# Patient Record
Sex: MALE | Race: WHITE | NOT HISPANIC OR LATINO | Employment: FULL TIME | URBAN - METROPOLITAN AREA
[De-identification: names, ages, dates, MRNs, and addresses within clinical notes are randomized per-mention and may not be internally consistent; named-entity substitution may affect disease eponyms.]

---

## 2018-11-01 ENCOUNTER — IMMUNIZATION (OUTPATIENT)
Dept: SOCIAL WORK | Facility: CLINIC | Age: 28
End: 2018-11-01
Payer: COMMERCIAL

## 2018-11-01 DIAGNOSIS — Z23 NEED FOR VACCINATION: ICD-10-CM

## 2018-11-01 PROCEDURE — 90686 IIV4 VACC NO PRSV 0.5 ML IM: CPT | Performed by: REGISTERED NURSE

## 2018-11-01 PROCEDURE — 90471 IMMUNIZATION ADMIN: CPT | Performed by: REGISTERED NURSE

## 2019-10-30 ENCOUNTER — IMMUNIZATION (OUTPATIENT)
Dept: SOCIAL WORK | Facility: CLINIC | Age: 29
End: 2019-10-30
Payer: COMMERCIAL

## 2019-10-30 DIAGNOSIS — Z23 NEED FOR VACCINATION: ICD-10-CM

## 2019-10-30 PROCEDURE — 90471 IMMUNIZATION ADMIN: CPT | Performed by: REGISTERED NURSE

## 2019-10-30 PROCEDURE — 90686 IIV4 VACC NO PRSV 0.5 ML IM: CPT | Performed by: REGISTERED NURSE

## 2019-12-23 ENCOUNTER — OFFICE VISIT (OUTPATIENT)
Dept: MEDICAL GROUP | Facility: PHYSICIAN GROUP | Age: 29
End: 2019-12-23
Payer: COMMERCIAL

## 2019-12-23 ENCOUNTER — HOSPITAL ENCOUNTER (OUTPATIENT)
Dept: LAB | Facility: MEDICAL CENTER | Age: 29
End: 2019-12-23
Attending: FAMILY MEDICINE
Payer: COMMERCIAL

## 2019-12-23 VITALS
BODY MASS INDEX: 21.11 KG/M2 | HEIGHT: 70 IN | HEART RATE: 58 BPM | WEIGHT: 147.5 LBS | DIASTOLIC BLOOD PRESSURE: 64 MMHG | RESPIRATION RATE: 18 BRPM | OXYGEN SATURATION: 98 % | SYSTOLIC BLOOD PRESSURE: 108 MMHG | TEMPERATURE: 98 F

## 2019-12-23 DIAGNOSIS — R36.1 BLOODY EJACULATION: ICD-10-CM

## 2019-12-23 DIAGNOSIS — Z23 NEED FOR VACCINATION: ICD-10-CM

## 2019-12-23 DIAGNOSIS — Z13.6 SCREENING FOR CARDIOVASCULAR CONDITION: ICD-10-CM

## 2019-12-23 DIAGNOSIS — N45.1 EPIDIDYMITIS, LEFT: ICD-10-CM

## 2019-12-23 LAB
APPEARANCE UR: CLEAR
BACTERIA #/AREA URNS HPF: NEGATIVE /HPF
BILIRUB UR QL STRIP.AUTO: NEGATIVE
CHOLEST SERPL-MCNC: 173 MG/DL (ref 100–199)
COLOR UR: YELLOW
EPI CELLS #/AREA URNS HPF: NEGATIVE /HPF
GLUCOSE UR STRIP.AUTO-MCNC: NEGATIVE MG/DL
HDLC SERPL-MCNC: 55 MG/DL
HYALINE CASTS #/AREA URNS LPF: ABNORMAL /LPF
KETONES UR STRIP.AUTO-MCNC: NEGATIVE MG/DL
LDLC SERPL CALC-MCNC: 96 MG/DL
LEUKOCYTE ESTERASE UR QL STRIP.AUTO: NEGATIVE
MICRO URNS: ABNORMAL
NITRITE UR QL STRIP.AUTO: NEGATIVE
PH UR STRIP.AUTO: 7.5 [PH] (ref 5–8)
PROT UR QL STRIP: NEGATIVE MG/DL
PSA SERPL-MCNC: 0.72 NG/ML (ref 0–4)
RBC # URNS HPF: ABNORMAL /HPF
RBC UR QL AUTO: ABNORMAL
SP GR UR STRIP.AUTO: 1.01
SPERM #/AREA URNS HPF: ABNORMAL /HPF
TRIGL SERPL-MCNC: 108 MG/DL (ref 0–149)
UROBILINOGEN UR STRIP.AUTO-MCNC: 0.2 MG/DL
WBC #/AREA URNS HPF: ABNORMAL /HPF

## 2019-12-23 PROCEDURE — 84153 ASSAY OF PSA TOTAL: CPT

## 2019-12-23 PROCEDURE — 90471 IMMUNIZATION ADMIN: CPT | Performed by: FAMILY MEDICINE

## 2019-12-23 PROCEDURE — 87491 CHLMYD TRACH DNA AMP PROBE: CPT

## 2019-12-23 PROCEDURE — 36415 COLL VENOUS BLD VENIPUNCTURE: CPT

## 2019-12-23 PROCEDURE — 81001 URINALYSIS AUTO W/SCOPE: CPT

## 2019-12-23 PROCEDURE — 90715 TDAP VACCINE 7 YRS/> IM: CPT | Performed by: FAMILY MEDICINE

## 2019-12-23 PROCEDURE — 99203 OFFICE O/P NEW LOW 30 MIN: CPT | Mod: 25 | Performed by: FAMILY MEDICINE

## 2019-12-23 PROCEDURE — 87591 N.GONORRHOEAE DNA AMP PROB: CPT

## 2019-12-23 PROCEDURE — 80061 LIPID PANEL: CPT

## 2019-12-23 RX ORDER — LEVOFLOXACIN 500 MG/1
500 TABLET, FILM COATED ORAL DAILY
Qty: 10 TAB | Refills: 0 | Status: SHIPPED | OUTPATIENT
Start: 2019-12-23 | End: 2020-01-02

## 2019-12-23 SDOH — HEALTH STABILITY: MENTAL HEALTH: HOW OFTEN DO YOU HAVE A DRINK CONTAINING ALCOHOL?: 4 OR MORE TIMES A WEEK

## 2019-12-23 ASSESSMENT — PATIENT HEALTH QUESTIONNAIRE - PHQ9: CLINICAL INTERPRETATION OF PHQ2 SCORE: 0

## 2019-12-23 NOTE — PROGRESS NOTES
"cc: bloody ejaculate       Subjective:     Mario Meade is a 29 y.o. male presenting for the following:     About 5 weeks ago patient noticed a small amount of blood in his ejaculate.  At first it was a light pink color but it has worsened since its onset.  The color is now closer to a dark red.  He does not have any new sexual partners, monogamous with same partner for more than 4 years.  He does not have any burning with urination or urinary frequency..  He does have a mild left testicular pain.  This is not severe.  No trauma to the area.      Review of systems:  All others reviewed and are negative.       Current Outpatient Medications:   •  levoFLOXacin (LEVAQUIN) 500 MG tablet, Take 1 Tab by mouth every day for 10 days., Disp: 10 Tab, Rfl: 0    Allergies, past medical history, past surgical history, family history, social history reviewed and updated    Objective:     Vitals: /64 (BP Location: Left arm, Patient Position: Sitting, BP Cuff Size: Adult)   Pulse (!) 58   Temp 36.7 °C (98 °F) (Temporal)   Resp 18   Ht 1.778 m (5' 10\")   Wt 66.9 kg (147 lb 8 oz)   SpO2 98%   BMI 21.16 kg/m²   General: Alert, pleasant, NAD  HEENT: Normocephalic.   EOMI, no icterus or pallor.  Conjunctivae and lids normal. External ears and nose normal. Oropharynx non-erythematous, mucous membranes moist.    Neck supple.  No thyromegaly or masses palpated. No cervical or supraclavicular lymphadenopathy.  Heart: Regular rate and rhythm.  S1 and S2 normal.  No murmurs appreciated.  Respiratory: Normal respiratory effort.  Clear to auscultation bilaterally.  Abdomen: Non-distended, soft  Skin: Warm, dry, no rashes in exposed areas.  Musculoskeletal: Gait is normal.  Moves all extremities well.  Extremities: No leg edema.    Genital: Normal circumcised penis.  Left testicle slightly tender to palpation.  No masses in either testicle.  No rashes or skin changes.  MIRANDA: Normal tone.  Prostate smooth without masses.  Not " boggy.  Neurological: sensation grossly intact,  tone/strength normal, gait is normal, CN2-12 grossly intact  Psych:  Affect is normal, judgement is good, grooming is appropriate.    Assessment/Plan:     Mario was seen today for [facility] care and other.    Diagnoses and all orders for this visit:    Bloody ejaculate possibly due to a left epididymitis.  Unlikely prostatitis but will check PSA.  Also, patient is low risk for STD but will check for chlamydia gonorrhea.  Unlikely UTI but will ensure no infection with urinalysis.  No masses felt will obtain ultrasound of testicles prior to referral to urology.  Will treat empirically with levofloxacin for 10 days.  Patient warned of common side effects as well as rare risk of aortic aneurysm or tendinitis.  Bloody ejaculation  -     Chlamydia/GC PCR Urine Or Swab; Future  -     URINALYSIS,CULTURE IF INDICATED; Future  -     [id number]; Future  -     PROSTATE SPECIFIC AG SCREENING; Future  -     REFERRAL TO UROLOGY  Epididymitis, left  -     REFERRAL TO UROLOGY  -     levoFLOXacin (LEVAQUIN) 500 MG tablet; Take 1 Tab by mouth every day for 10 days.      Need for vaccination Patient due for vaccination.  Patient warned of possible side effect including pain, reaction at injection site, fatigue, low-grade fever.  Also, patient warned of uncommon severe reactions including allergic reaction/anaphylaxis.   -     Tdap Vaccine =>6YO IM    Screening for cardiovascular condition  -     Lipid Profile; Future    Return if symptoms worsen or fail to improve.

## 2019-12-24 LAB
C TRACH DNA SPEC QL NAA+PROBE: NEGATIVE
N GONORRHOEA DNA SPEC QL NAA+PROBE: NEGATIVE
SPECIMEN SOURCE: NORMAL

## 2020-01-07 ENCOUNTER — TELEPHONE (OUTPATIENT)
Dept: MEDICAL GROUP | Facility: PHYSICIAN GROUP | Age: 30
End: 2020-01-07

## 2020-01-10 ENCOUNTER — HOSPITAL ENCOUNTER (OUTPATIENT)
Dept: RADIOLOGY | Facility: MEDICAL CENTER | Age: 30
End: 2020-01-10
Attending: FAMILY MEDICINE
Payer: COMMERCIAL

## 2020-01-10 DIAGNOSIS — R36.1 BLOODY EJACULATION: ICD-10-CM

## 2020-01-10 PROCEDURE — 76870 US EXAM SCROTUM: CPT

## 2020-10-14 ENCOUNTER — IMMUNIZATION (OUTPATIENT)
Dept: SOCIAL WORK | Facility: CLINIC | Age: 30
End: 2020-10-14
Payer: COMMERCIAL

## 2020-10-14 DIAGNOSIS — Z23 NEED FOR VACCINATION: ICD-10-CM

## 2020-10-14 PROCEDURE — 90471 IMMUNIZATION ADMIN: CPT | Performed by: REGISTERED NURSE

## 2020-10-14 PROCEDURE — 90686 IIV4 VACC NO PRSV 0.5 ML IM: CPT | Performed by: REGISTERED NURSE

## 2021-05-28 ENCOUNTER — OFFICE VISIT (OUTPATIENT)
Dept: URGENT CARE | Facility: PHYSICIAN GROUP | Age: 31
End: 2021-05-28
Payer: COMMERCIAL

## 2021-05-28 ENCOUNTER — APPOINTMENT (OUTPATIENT)
Dept: RADIOLOGY | Facility: IMAGING CENTER | Age: 31
End: 2021-05-28
Attending: PHYSICIAN ASSISTANT
Payer: COMMERCIAL

## 2021-05-28 VITALS
OXYGEN SATURATION: 95 % | WEIGHT: 155 LBS | TEMPERATURE: 98.1 F | RESPIRATION RATE: 12 BRPM | BODY MASS INDEX: 22.19 KG/M2 | SYSTOLIC BLOOD PRESSURE: 118 MMHG | DIASTOLIC BLOOD PRESSURE: 62 MMHG | HEART RATE: 71 BPM | HEIGHT: 70 IN

## 2021-05-28 DIAGNOSIS — S99.922A INJURY OF LEFT FOOT, INITIAL ENCOUNTER: ICD-10-CM

## 2021-05-28 PROBLEM — N43.40 SPERMATOCELE: Status: ACTIVE | Noted: 2020-01-13

## 2021-05-28 PROBLEM — R31.29 MICROSCOPIC HEMATURIA: Status: ACTIVE | Noted: 2020-01-13

## 2021-05-28 PROBLEM — R36.1 HEMOSPERMIA: Status: ACTIVE | Noted: 2020-01-13

## 2021-05-28 PROCEDURE — 73630 X-RAY EXAM OF FOOT: CPT | Mod: TC,LT | Performed by: PHYSICIAN ASSISTANT

## 2021-05-28 PROCEDURE — 99213 OFFICE O/P EST LOW 20 MIN: CPT | Performed by: PHYSICIAN ASSISTANT

## 2021-05-28 ASSESSMENT — ENCOUNTER SYMPTOMS
TINGLING: 0
INABILITY TO BEAR WEIGHT: 1
FALLS: 0
NUMBNESS: 0
SENSORY CHANGE: 0
LOSS OF SENSATION: 0
LOSS OF MOTION: 0

## 2021-05-28 NOTE — PROGRESS NOTES
"Subjective:      Mario Meade is a 31 y.o. male who presents with Leg Injury (happened last night , playing softball and injured left leg, pt wants to make sure no broken bones, having a hard time walking on it )            Patient is a 31-year-old male who presents to urgent care with worsening pain to the inside portion of his left foot after getting hats with a softball.  Patient reports at that time it was very sore however he even played afterwards.  He reports immediate and worsening pain once he took off his cleats which then worsened upon wakening this morning.  Patient admits that his having difficulty applying any weight on the region.  He denies any noted bruising or swelling. He has not taken anything for his symptoms.     Leg Injury   The incident occurred 12 to 24 hours ago. [place]. The injury mechanism was a direct blow. Pain location: Left foot- hit with softball.  The quality of the pain is described as aching. The pain is mild. Associated symptoms include an inability to bear weight. Pertinent negatives include no loss of motion, loss of sensation, numbness or tingling. The symptoms are aggravated by weight bearing and movement. The treatment provided no relief.       Review of Systems   Musculoskeletal: Positive for joint pain. Negative for falls.   Neurological: Negative for tingling, sensory change and numbness.   All other systems reviewed and are negative.         Objective:     /62 (BP Location: Right arm, Patient Position: Sitting, BP Cuff Size: Adult)   Pulse 71   Temp 36.7 °C (98.1 °F) (Temporal)   Resp 12   Ht 1.778 m (5' 10\")   Wt 70.3 kg (155 lb)   SpO2 95%   BMI 22.24 kg/m²    PMH:  has no past medical history on file.  MEDS: Reviewed .   ALLERGIES: No Known Allergies  SURGHX: No past surgical history on file.  SOCHX:  reports that he has never smoked. He has never used smokeless tobacco. He reports current alcohol use. He reports previous " drug use.  FH: Family history was reviewed, no pertinent findings to report    Physical Exam  Vitals reviewed.   Constitutional:       General: He is not in acute distress.     Appearance: He is well-developed.   HENT:      Head: Normocephalic and atraumatic.   Eyes:      Conjunctiva/sclera: Conjunctivae normal.      Pupils: Pupils are equal, round, and reactive to light.   Neck:      Trachea: No tracheal deviation.   Cardiovascular:      Rate and Rhythm: Normal rate.   Pulmonary:      Effort: Pulmonary effort is normal. No respiratory distress.   Musculoskeletal:         General: Tenderness present.      Cervical back: Normal range of motion and neck supple.        Legs:       Comments: Left foot: Medial foot tenderness with deep palpation- without noted erythema or bruising.   Digits NT. Without noted edema.   Ankle NT.   DPF intact- painful dorsiflexion.   N/V intact distally.    Skin:     General: Skin is warm.   Neurological:      Mental Status: He is alert and oriented to person, place, and time.   Psychiatric:         Behavior: Behavior normal.         Thought Content: Thought content normal.         Judgment: Judgment normal.                        Assessment/Plan:        1. Injury of left foot, initial encounter  - DX-FOOT-COMPLETE 3+ LEFT; Future        RADIOLOGY RESULTS   DX-FOOT-COMPLETE 3+ LEFT    Result Date: 5/28/2021 5/28/2021 3:14 PM HISTORY/REASON FOR EXAM:  Pain/Deformity Following Trauma; medial foot pain TECHNIQUE/EXAM DESCRIPTION AND NUMBER OF VIEWS: 3 views of the LEFT foot. COMPARISON: FINDINGS: The alignment is grossly normal. There is no evidence of displaced fracture or dislocation. There is no focal soft tissue swelling.     Normal foot series.       Discussed x-ray findings with the patient today.  Patient is about to go camping-crutches were given to assist with weightbearing during patient's trip.  Compression, ice, NSAIDs.  If minimal improvement after 7 to 10 days of conservative  therapies and activity as tolerated-patient is to return to clinic for recheck.  Appropriate PPE worn at all times by provider.   Pt. Had face mask on throughout entirety of the visit other than oropharyngeal examination today.     Side effects of OTC or prescribed medications discussed.     DDX, Supportive care, and indications for immediate follow-up discussed with patient.    Instructed to return to clinic or nearest emergency department if we are not available for any change in condition, further concerns, or worsening of symptoms.    The patient and/or guardian demonstrated a good understanding and agreed with the treatment plan.    Please note that this dictation was created using voice recognition software. I have made every reasonable attempt to correct obvious errors, but I expect that there are errors of grammar and possibly content that I did not discover before finalizing the note.

## 2022-10-11 ENCOUNTER — OFFICE VISIT (OUTPATIENT)
Dept: URGENT CARE | Facility: CLINIC | Age: 32
End: 2022-10-11
Payer: COMMERCIAL

## 2022-10-11 VITALS
HEIGHT: 70 IN | OXYGEN SATURATION: 98 % | DIASTOLIC BLOOD PRESSURE: 72 MMHG | BODY MASS INDEX: 22.19 KG/M2 | HEART RATE: 66 BPM | SYSTOLIC BLOOD PRESSURE: 110 MMHG | TEMPERATURE: 98.2 F | RESPIRATION RATE: 18 BRPM | WEIGHT: 155 LBS

## 2022-10-11 DIAGNOSIS — S61.432A PUNCTURE WOUND OF LEFT HAND WITHOUT FOREIGN BODY, INITIAL ENCOUNTER: ICD-10-CM

## 2022-10-11 PROCEDURE — 99213 OFFICE O/P EST LOW 20 MIN: CPT | Performed by: PHYSICIAN ASSISTANT

## 2022-10-11 RX ORDER — SULFAMETHOXAZOLE AND TRIMETHOPRIM 800; 160 MG/1; MG/1
1 TABLET ORAL 2 TIMES DAILY
Qty: 14 TABLET | Refills: 0 | Status: SHIPPED | OUTPATIENT
Start: 2022-10-11 | End: 2022-10-18

## 2022-10-12 ASSESSMENT — ENCOUNTER SYMPTOMS
MYALGIAS: 0
TINGLING: 0
BRUISES/BLEEDS EASILY: 0
WEAKNESS: 0

## 2022-10-12 NOTE — PROGRESS NOTES
Subjective:     CHIEF COMPLAINT  Chief Complaint   Patient presents with    Hand Injury     Poked with a Gretchen nail today       HPI  Mario Meade is a very pleasant 32 y.o. male who presents to the clinic after sustaining a puncture wound to his left hand this afternoon.  Patient states a gretchen nail was sticking out of a pallet and he punctured the dorsal aspect of his left hand.  No remaining foreign bodies present.  Able to get bleeding controlled with compression.  He thoroughly cleansed the area with soap and water as well as antiseptic solution.  Currently has antibiotic ointment and a Band-Aid in place.  Denies any pain at this time.  No numbness or tingling distally.  No limitations in range of motion of the hand or digits.  Tetanus was last updated in 2019.    REVIEW OF SYSTEMS  Review of Systems   Musculoskeletal:  Negative for joint pain and myalgias.   Skin:         Puncture wound to left hand   Neurological:  Negative for tingling and weakness.   Endo/Heme/Allergies:  Does not bruise/bleed easily.     PAST MEDICAL HISTORY  Patient Active Problem List    Diagnosis Date Noted    Spermatocele 01/13/2020    Microscopic hematuria 01/13/2020    Hemospermia 01/13/2020       SURGICAL HISTORY  patient denies any surgical history    ALLERGIES  No Known Allergies    CURRENT MEDICATIONS  Home Medications       Reviewed by Joaquin Lam P.A.-C. (Physician Assistant) on 10/11/22 at 2033  Med List Status: <None>     Medication Last Dose Status        Patient Henok Taking any Medications                           SOCIAL HISTORY  Social History     Tobacco Use    Smoking status: Never    Smokeless tobacco: Never   Vaping Use    Vaping Use: Never used   Substance and Sexual Activity    Alcohol use: Yes     Comment: occasionally    Drug use: Not Currently    Sexual activity: Not on file       FAMILY HISTORY  History reviewed. No pertinent family history.       Objective:     VITAL SIGNS: /72   Pulse 66    "Temp 36.8 °C (98.2 °F) (Temporal)   Resp 18   Ht 1.778 m (5' 10\")   Wt 70.3 kg (155 lb)   SpO2 98%   BMI 22.24 kg/m²     PHYSICAL EXAM  Physical Exam  Constitutional:       Appearance: Normal appearance.   HENT:      Head: Normocephalic and atraumatic.   Eyes:      Conjunctiva/sclera: Conjunctivae normal.   Musculoskeletal:         General: No swelling or tenderness. Normal range of motion.        Hands:       Cervical back: Normal range of motion.      Comments: Left hand: Small puncture wound to the dorsal aspect of the left hand.  No active bleeding.  No underlying foreign bodies visualized.  Patient maintains full range of motion of the wrist and all digits.  No purulent discharge or drainage.   Skin:     Capillary Refill: Capillary refill takes less than 2 seconds.   Neurological:      General: No focal deficit present.      Mental Status: He is alert and oriented to person, place, and time. Mental status is at baseline.       Assessment/Plan:     1. Puncture wound of left hand without foreign body, initial encounter  - sulfamethoxazole-trimethoprim (BACTRIM DS) 800-160 MG tablet; Take 1 Tablet by mouth 2 times a day for 7 days.  Dispense: 14 Tablet; Refill: 0      MDM/Comments:    Patient sustained a small puncture wound to the dorsal aspect of the left hand this afternoon.  Thoroughly irrigated and cleansed by the patient at his house.  No underlying foreign bodies present.  Patient's tetanus is up-to-date.  Full and pain-free range of motion of the hand.  We will cover for potential infection with a course of Bactrim.  Wound care instructions were discussed.  Return precautions for any signs of infection.    Differential diagnosis, natural history, supportive care, and indications for immediate follow-up discussed. All questions answered. Patient agrees with the plan of care.    Follow-up as needed if symptoms worsen or fail to improve to PCP, Urgent care or Emergency Room.    I have personally reviewed " prior external notes and test results pertinent to today's visit.  I have independently reviewed and interpreted all diagnostics ordered during this urgent care acute visit.   Discussed management options (risks,benefits, and alternatives to treatment). Pt expresses understanding and the treatment plan was agreed upon. Questions were encouraged and answered to pt's satisfaction.    Please note that this dictation was created using voice recognition software. I have made a reasonable attempt to correct obvious errors, but I expect that there are errors of grammar and possibly content that I did not discover before finalizing the note.

## 2025-02-05 ENCOUNTER — OFFICE VISIT (OUTPATIENT)
Dept: MEDICAL GROUP | Facility: CLINIC | Age: 35
End: 2025-02-05
Payer: COMMERCIAL

## 2025-02-05 VITALS
HEIGHT: 70 IN | WEIGHT: 146.4 LBS | TEMPERATURE: 97.9 F | DIASTOLIC BLOOD PRESSURE: 70 MMHG | SYSTOLIC BLOOD PRESSURE: 116 MMHG | HEART RATE: 52 BPM | OXYGEN SATURATION: 95 % | BODY MASS INDEX: 20.96 KG/M2

## 2025-02-05 DIAGNOSIS — F41.9 ANXIETY: ICD-10-CM

## 2025-02-05 DIAGNOSIS — F32.1 CURRENT MODERATE EPISODE OF MAJOR DEPRESSIVE DISORDER WITHOUT PRIOR EPISODE (HCC): ICD-10-CM

## 2025-02-05 DIAGNOSIS — G47.9 DIFFICULTY SLEEPING: ICD-10-CM

## 2025-02-05 DIAGNOSIS — Z01.89 ENCOUNTER FOR ROUTINE LABORATORY TESTING: ICD-10-CM

## 2025-02-05 PROBLEM — F32.A DEPRESSION: Status: ACTIVE | Noted: 2025-02-05

## 2025-02-05 PROCEDURE — 99204 OFFICE O/P NEW MOD 45 MIN: CPT | Mod: GC

## 2025-02-05 PROCEDURE — 3074F SYST BP LT 130 MM HG: CPT | Mod: GC

## 2025-02-05 PROCEDURE — 3078F DIAST BP <80 MM HG: CPT | Mod: GC

## 2025-02-05 RX ORDER — DULOXETIN HYDROCHLORIDE 20 MG/1
20 CAPSULE, DELAYED RELEASE ORAL DAILY
Qty: 30 CAPSULE | Status: CANCELLED | OUTPATIENT
Start: 2025-02-05

## 2025-02-05 RX ORDER — DULOXETIN HYDROCHLORIDE 30 MG/1
30 CAPSULE, DELAYED RELEASE ORAL DAILY
Qty: 60 CAPSULE | Refills: 4 | Status: SHIPPED | OUTPATIENT
Start: 2025-02-05

## 2025-02-05 ASSESSMENT — PATIENT HEALTH QUESTIONNAIRE - PHQ9
5. POOR APPETITE OR OVEREATING: 0 - NOT AT ALL
SUM OF ALL RESPONSES TO PHQ QUESTIONS 1-9: 14
CLINICAL INTERPRETATION OF PHQ2 SCORE: 2

## 2025-02-05 ASSESSMENT — ANXIETY QUESTIONNAIRES
4. TROUBLE RELAXING: MORE THAN HALF THE DAYS
7. FEELING AFRAID AS IF SOMETHING AWFUL MIGHT HAPPEN: NOT AT ALL
5. BEING SO RESTLESS THAT IT IS HARD TO SIT STILL: MORE THAN HALF THE DAYS
GAD7 TOTAL SCORE: 14
IF YOU CHECKED OFF ANY PROBLEMS ON THIS QUESTIONNAIRE, HOW DIFFICULT HAVE THESE PROBLEMS MADE IT FOR YOU TO DO YOUR WORK, TAKE CARE OF THINGS AT HOME, OR GET ALONG WITH OTHER PEOPLE: VERY DIFFICULT
6. BECOMING EASILY ANNOYED OR IRRITABLE: MORE THAN HALF THE DAYS
3. WORRYING TOO MUCH ABOUT DIFFERENT THINGS: NEARLY EVERY DAY
2. NOT BEING ABLE TO STOP OR CONTROL WORRYING: NEARLY EVERY DAY
1. FEELING NERVOUS, ANXIOUS, OR ON EDGE: MORE THAN HALF THE DAYS

## 2025-02-05 NOTE — PROGRESS NOTES
Subjective:     CC: Establish care, trouble sleeping, anxiety    HISTORY OF THE PRESENT ILLNESS: Patient is a 34 y.o. male. This pleasant patient is here today to establish care and discuss trouble sleeping and anxiety. For the last 5-6 months he has had increased trouble sleeping. He started taking melatonin 10mg last week. He says he feels a little drowsy in the mornings, he is not sure how much it is helping. He has trouble falling and staying asleep but mostly trouble staying asleep. He wakes up a lot in the  middle of the night and can't fall back asleep. He has been under more pressure/stress at work. He typically goes to bed around 10pm and typically gets up at 7am.     Has had anxiety for years. He has never been treated. He feels like it does interfere with his work. Patient works as a  and sits at a computer most of the day. He says he never noticed depressive symptoms previously but did notice that he answered yes to a lot of questions on the PHQ9.     He also has neck and shoulder pain that he describes as chronic. He says he hasn't done much for it, he does think it is likely because he works mostly at a computer. He has a standing desk which he does use occasionally.     SHAWN-7 Questionnaire    Feeling nervous, anxious, or on edge: More than half the days  Not being able to sop or control worrying: Nearly every day  Worrying too much about different things: Nearly every day  Trouble relaxing: More than half the days  Being so restless that it's hard to sit still: More than half the days  Becoming easily annoyed or irritable: More than half the days  Feeling afraid as if something awful might happen: Not at all  Total: 14    Interpretation of SHAWN 7 Total Score   Score Severity :  0-4 No Anxiety   5-9 Mild Anxiety  10-14 Moderate Anxiety  15-21 Severe Anxiety     Depression Screening    Little interest or pleasure in doing things?  1 - several days  Feeling down, depressed , or hopeless? 1 -  several days  Trouble falling or staying asleep, or sleeping too much?  3 - nearly every day  Feeling tired or having little energy?  3 - nearly every day  Poor appetite or overeating?  0 - not at all  Feeling bad about yourself - or that you are a failure or have let yourself or your family down? 3 - nearly every day  Trouble concentrating on things, such as reading the newspaper or watching television? 3 - nearly every day  Moving or speaking so slowly that other people could have noticed.  Or the opposite - being so fidgety or restless that you have been moving around a lot more than usual?  0 - not at all  Thoughts that you would be better off dead, or of hurting yourself?  0 - not at all  Patient Health Questionnaire Score: 14      If depressive symptoms identified deferred to follow up visit unless specifically addressed in assesment and plan.    Interpretation of PHQ-9 Total Score   Score Severity   1-4 No Depression   5-9 Mild Depression   10-14 Moderate Depression   15-19 Moderately Severe Depression   20-27 Severe Depression     Surgical Hx:   - Arthroscopy on knee    No family hx of heart disease, diabetes, cancer        PAST MEDICAL HISTORY:  No past medical history on file.      SOCIAL HISTORY:   Pt lives with partner  Tobacco use: occasional cigarette smoking (once every couple of months)  ETOH use: 1-2 glasses of wine, pt is trying to cut back   Drug use: infrequent use of marijuana and mushroom use; Kratum use a couple of times a week    DIET:   No orders of the defined types were placed in this encounter.      ALLERGIES:  No Known Allergies    MEDICATIONS:    Current Outpatient Medications:     DULoxetine (CYMBALTA) 30 MG Cap DR Particles, Take 1 Capsule by mouth every day., Disp: 60 Capsule, Rfl: 4    ROS:   Gen: no fevers/chills, no changes in weight  Eyes: no changes in vision  ENT: no changes in hearing  Pulm: no sob, no cough  CV: no chest pain, no palpitations  GI: no nausea/vomiting, no  "diarrhea  MSk: no myalgias  Skin: no rash  Neuro: no headaches, no numbness/tingling      Objective:     Exam: /70 (BP Location: Left arm, Patient Position: Sitting, BP Cuff Size: Adult)   Pulse (!) 52   Temp 36.6 °C (97.9 °F) (Oral)   Ht 1.778 m (5' 10\")   Wt 66.4 kg (146 lb 6.4 oz)   SpO2 95%  Body mass index is 21.01 kg/m².    General: Normal appearing. No distress.  HEENT: Normocephalic. Eyes conjunctiva clear lids without ptosis, pupils equal and reactive to light accommodation, ears normal shape and contour  Neck: Supple without JVD or bruit. Thyroid is not enlarged.  Pulmonary: Clear to ausculation.  Normal effort. No rales, ronchi, or wheezing.  Cardiovascular: Regular rate and rhythm without murmur. Carotid and radial pulses are intact and equal bilaterally.  Abdomen: Soft, nontender, nondistended. Normal bowel sounds. Liver and spleen are not palpable  Neurologic: Grossly nonfocal  Skin: Warm and dry.  No obvious lesions.  Musculoskeletal: Normal gait. No obvious abnormalities.  No extremity cyanosis, clubbing, or edema.  Psych: Normal mood and affect. Alert and oriented x3. Judgment and insight is normal.    Assessment & Plan:   34 y.o. male with the following -    Depression  Patient scored a 14 on PHQ-9.  He says he is never noticed depressive symptoms until filling out the form, though he says he does often have decreased interest in activities and does feel down.  Plan:   - Will start Duloxetine 30mg daily to treat depression and anxiety, patient to follow up in one month to discuss increasing dose   - Referral to behavioral health for therapy    Anxiety  Patient admits to a history of anxiety, though never been formally diagnosed, evaluated, or treated.  He feels that the anxiety is closely related to stress at work.  He is also having difficulty sleeping which seems to make the anxiety worse.  His SHAWN-7 score was a 14, indicating moderate anxiety.  Discussed with patient the benefits of " starting medication as well as talk therapy.  Patient agrees to plan.  Discussed multiple medication options and their side effects.  Discussion included starting mirtazapine as it could potentially help him sleep however does have the side effect of weight gain.  Patient would like to start medication with fewer side effects, ultimately decided to start duloxetine.  Plan:   - Duloxetine 30mg daily  - Referral to behavioral health for therapy   -Patient has not had lab work done in years, will get TSH with reflex to T4    Encounter for routine laboratory testing  Patient has not seen a doctor in years or had lab work done.  Will get baseline labs to include CBC, CMP, lipid panel, hemoglobin A1c.  Patient will follow-up in 1 month to discuss lab work.    Difficulty sleeping  Patient has been having difficulty sleeping for the last few months.  He has started taking melatonin 10 mg nightly which he says has not made much of a difference yet.  Difficulty sleeping is likely related to anxiety and depression, patient scored a 14 on both the SHAWN-7 and PHQ-9.  He does feel like he has been under more stress at work which could also be contributing.  Plan:   -Spoke extensively about good sleep hygiene, including limiting bluelight before bed, keeping room dark and decreasing the temperature, limiting liquids prior to going to sleep, limiting caffeine intake in the afternoons, and getting out of bed for periods of time if he is having difficulty falling asleep. Also discussed limiting alcohol intake as patient states he drinks 1-2 glasses of wine nightly.      Patient to follow up in one month to discuss lab results and evaluate response to medication.    Radha Mejia D.O.

## 2025-02-06 NOTE — ASSESSMENT & PLAN NOTE
Patient scored a 14 on PHQ-9.  He says he is never noticed depressive symptoms until filling out the form, though he says he does often have decreased interest in activities and does feel down.  Plan:   - Will start Duloxetine 30mg daily to treat depression and anxiety, patient to follow up in one month to discuss increasing dose   - Referral to behavioral health for therapy

## 2025-02-06 NOTE — ASSESSMENT & PLAN NOTE
Patient has not seen a doctor in years or had lab work done.  Will get baseline labs to include CBC, CMP, lipid panel, hemoglobin A1c.  Patient will follow-up in 1 month to discuss lab work.

## 2025-02-06 NOTE — ASSESSMENT & PLAN NOTE
Patient admits to a history of anxiety, though never been formally diagnosed, evaluated, or treated.  He feels that the anxiety is closely related to stress at work.  He is also having difficulty sleeping which seems to make the anxiety worse.  His SHAWN-7 score was a 14, indicating moderate anxiety.  Discussed with patient the benefits of starting medication as well as talk therapy.  Patient agrees to plan.  Discussed multiple medication options and their side effects.  Discussion included starting mirtazapine as it could potentially help him sleep however does have the side effect of weight gain.  Patient would like to start medication with fewer side effects, ultimately decided to start duloxetine.  Plan:   - Duloxetine 30mg daily  - Referral to behavioral health for therapy   -Patient has not had lab work done in years, will get TSH with reflex to T4

## 2025-02-06 NOTE — ASSESSMENT & PLAN NOTE
Patient has been having difficulty sleeping for the last few months.  He has started taking melatonin 10 mg nightly which he says has not made much of a difference yet.  Difficulty sleeping is likely related to anxiety and depression, patient scored a 14 on both the SHAWN-7 and PHQ-9.  He does feel like he has been under more stress at work which could also be contributing.  Plan:   -Spoke extensively about good sleep hygiene, including limiting bluelight before bed, keeping room dark and decreasing the temperature, limiting liquids prior to going to sleep, limiting caffeine intake in the afternoons, and getting out of bed for periods of time if he is having difficulty falling asleep. Also discussed limiting alcohol intake as patient states he drinks 1-2 glasses of wine nightly.

## 2025-02-19 ENCOUNTER — APPOINTMENT (OUTPATIENT)
Dept: LAB | Facility: MEDICAL CENTER | Age: 35
End: 2025-02-19
Payer: COMMERCIAL

## 2025-02-21 ENCOUNTER — HOSPITAL ENCOUNTER (OUTPATIENT)
Dept: LAB | Facility: MEDICAL CENTER | Age: 35
End: 2025-02-21
Payer: COMMERCIAL

## 2025-02-21 DIAGNOSIS — F41.9 ANXIETY: ICD-10-CM

## 2025-02-21 DIAGNOSIS — Z01.89 ENCOUNTER FOR ROUTINE LABORATORY TESTING: ICD-10-CM

## 2025-02-21 LAB
BASOPHILS # BLD AUTO: 0.7 % (ref 0–1.8)
BASOPHILS # BLD: 0.03 K/UL (ref 0–0.12)
EOSINOPHIL # BLD AUTO: 0.01 K/UL (ref 0–0.51)
EOSINOPHIL NFR BLD: 0.2 % (ref 0–6.9)
ERYTHROCYTE [DISTWIDTH] IN BLOOD BY AUTOMATED COUNT: 44.2 FL (ref 35.9–50)
EST. AVERAGE GLUCOSE BLD GHB EST-MCNC: 91 MG/DL
HBA1C MFR BLD: 4.8 % (ref 4–5.6)
HCT VFR BLD AUTO: 43.8 % (ref 42–52)
HGB BLD-MCNC: 15.2 G/DL (ref 14–18)
IMM GRANULOCYTES # BLD AUTO: 0.01 K/UL (ref 0–0.11)
IMM GRANULOCYTES NFR BLD AUTO: 0.2 % (ref 0–0.9)
LYMPHOCYTES # BLD AUTO: 1.54 K/UL (ref 1–4.8)
LYMPHOCYTES NFR BLD: 34.7 % (ref 22–41)
MCH RBC QN AUTO: 32.9 PG (ref 27–33)
MCHC RBC AUTO-ENTMCNC: 34.7 G/DL (ref 32.3–36.5)
MCV RBC AUTO: 94.8 FL (ref 81.4–97.8)
MONOCYTES # BLD AUTO: 0.31 K/UL (ref 0–0.85)
MONOCYTES NFR BLD AUTO: 7 % (ref 0–13.4)
NEUTROPHILS # BLD AUTO: 2.54 K/UL (ref 1.82–7.42)
NEUTROPHILS NFR BLD: 57.2 % (ref 44–72)
NRBC # BLD AUTO: 0 K/UL
NRBC BLD-RTO: 0 /100 WBC (ref 0–0.2)
PLATELET # BLD AUTO: 206 K/UL (ref 164–446)
PMV BLD AUTO: 11.2 FL (ref 9–12.9)
RBC # BLD AUTO: 4.62 M/UL (ref 4.7–6.1)
WBC # BLD AUTO: 4.4 K/UL (ref 4.8–10.8)

## 2025-02-21 PROCEDURE — 80061 LIPID PANEL: CPT

## 2025-02-21 PROCEDURE — 85025 COMPLETE CBC W/AUTO DIFF WBC: CPT

## 2025-02-21 PROCEDURE — 36415 COLL VENOUS BLD VENIPUNCTURE: CPT

## 2025-02-21 PROCEDURE — 84443 ASSAY THYROID STIM HORMONE: CPT

## 2025-02-21 PROCEDURE — 84439 ASSAY OF FREE THYROXINE: CPT

## 2025-02-21 PROCEDURE — 80053 COMPREHEN METABOLIC PANEL: CPT

## 2025-02-21 PROCEDURE — 83036 HEMOGLOBIN GLYCOSYLATED A1C: CPT

## 2025-02-22 ENCOUNTER — RESULTS FOLLOW-UP (OUTPATIENT)
Dept: HOSPITALIST | Facility: MEDICAL CENTER | Age: 35
End: 2025-02-22

## 2025-02-22 LAB
ALBUMIN SERPL BCP-MCNC: 5 G/DL (ref 3.2–4.9)
ALBUMIN/GLOB SERPL: 2 G/DL
ALP SERPL-CCNC: 47 U/L (ref 30–99)
ALT SERPL-CCNC: 20 U/L (ref 2–50)
ANION GAP SERPL CALC-SCNC: 15 MMOL/L (ref 7–16)
AST SERPL-CCNC: 19 U/L (ref 12–45)
BILIRUB SERPL-MCNC: 0.8 MG/DL (ref 0.1–1.5)
BUN SERPL-MCNC: 7 MG/DL (ref 8–22)
CALCIUM ALBUM COR SERPL-MCNC: 8.8 MG/DL (ref 8.5–10.5)
CALCIUM SERPL-MCNC: 9.6 MG/DL (ref 8.5–10.5)
CHLORIDE SERPL-SCNC: 100 MMOL/L (ref 96–112)
CHOLEST SERPL-MCNC: 169 MG/DL (ref 100–199)
CO2 SERPL-SCNC: 24 MMOL/L (ref 20–33)
CREAT SERPL-MCNC: 0.91 MG/DL (ref 0.5–1.4)
GFR SERPLBLD CREATININE-BSD FMLA CKD-EPI: 112 ML/MIN/1.73 M 2
GLOBULIN SER CALC-MCNC: 2.5 G/DL (ref 1.9–3.5)
GLUCOSE SERPL-MCNC: 97 MG/DL (ref 65–99)
HDLC SERPL-MCNC: 57 MG/DL
LDLC SERPL CALC-MCNC: 98 MG/DL
POTASSIUM SERPL-SCNC: 4.1 MMOL/L (ref 3.6–5.5)
PROT SERPL-MCNC: 7.5 G/DL (ref 6–8.2)
SODIUM SERPL-SCNC: 139 MMOL/L (ref 135–145)
T4 FREE SERPL-MCNC: 1.69 NG/DL (ref 0.93–1.7)
TRIGL SERPL-MCNC: 69 MG/DL (ref 0–149)
TSH SERPL DL<=0.005 MIU/L-ACNC: 0.36 UIU/ML (ref 0.38–5.33)

## 2025-03-05 ENCOUNTER — APPOINTMENT (OUTPATIENT)
Dept: MEDICAL GROUP | Facility: CLINIC | Age: 35
End: 2025-03-05
Payer: COMMERCIAL

## 2025-03-05 VITALS
WEIGHT: 144 LBS | BODY MASS INDEX: 20.62 KG/M2 | OXYGEN SATURATION: 98 % | RESPIRATION RATE: 16 BRPM | SYSTOLIC BLOOD PRESSURE: 116 MMHG | HEIGHT: 70 IN | DIASTOLIC BLOOD PRESSURE: 74 MMHG | TEMPERATURE: 98.4 F | HEART RATE: 64 BPM

## 2025-03-05 DIAGNOSIS — E05.90 SUBCLINICAL HYPERTHYROIDISM: ICD-10-CM

## 2025-03-05 DIAGNOSIS — F41.9 ANXIETY: ICD-10-CM

## 2025-03-05 DIAGNOSIS — G47.9 DIFFICULTY SLEEPING: ICD-10-CM

## 2025-03-05 PROCEDURE — 3078F DIAST BP <80 MM HG: CPT | Mod: GC

## 2025-03-05 PROCEDURE — 99214 OFFICE O/P EST MOD 30 MIN: CPT | Mod: GC

## 2025-03-05 PROCEDURE — 3074F SYST BP LT 130 MM HG: CPT | Mod: GC

## 2025-03-05 RX ORDER — TRAZODONE HYDROCHLORIDE 50 MG/1
50 TABLET ORAL NIGHTLY
Qty: 30 TABLET | Refills: 3 | Status: SHIPPED | OUTPATIENT
Start: 2025-03-05 | End: 2025-03-26

## 2025-03-05 RX ORDER — DULOXETIN HYDROCHLORIDE 30 MG/1
60 CAPSULE, DELAYED RELEASE ORAL DAILY
Qty: 60 CAPSULE | Refills: 4 | Status: SHIPPED | OUTPATIENT
Start: 2025-03-05 | End: 2025-03-17

## 2025-03-05 ASSESSMENT — FIBROSIS 4 INDEX: FIB4 SCORE: 0.72

## 2025-03-05 NOTE — ASSESSMENT & PLAN NOTE
Patient's TSH was low at 0.365, free T4 was high normal at 1.69. He does have insomnia and heart palpitations (though these seem to mostly be associated with stress). Patient had mild fullness of the right side of the thyroid.  Plan:   - Will repeat TSH with reflex to T4, if TSH continues to be low will order US

## 2025-03-05 NOTE — ASSESSMENT & PLAN NOTE
Patient was started on Cymbalta 30 mg at previous visit. He says he has had positive results. Discussed whether to increase dose or to continue with low dose given that he will be starting Trazadone. Patient would like to go forward with increasing dose to 60mg daily.   Plan:   - Increase Cymbalta to 60mg daily  - Patient has therapy appointment in May, he wanted another referral to be placed to include offices outside of [facility] network to see if he can get in sooner  - [facility]ave patients [facility] for [facility]   - Follow up in 3 weeks to monitor on increased dose

## 2025-03-05 NOTE — PROGRESS NOTES
"Subjective:     CC: Follow up for anxiety/depression/insomnia    HPI:   Mario presents today for follow up on anxiety/depression/insomnia. He was started on Cymbalta at 30mg last visit. He says at first the medication gave him some GI upset but that only lasted a couple of days and he has not had any more side effects. He does feel like his mood has improved slightly. He says he mostly feels like his anxiety has improved. He is no longer feeling heart palpitations when he is stressed at work.     Insomnia  Patient says that insomnia has not improved much.  He says he actually has slept well over the last week or so because he has been taking NyQuil due to being sick.  Prior to taking NyQuil he was not sleeping well despite continuing to take melatonin.  He said the other night after taking NyQuil was the first night he slept until his alarm went off.      Social Hx:    Problem   Subclinical Hyperthyroidism   Anxiety   Difficulty Sleeping       Current Outpatient Medications Ordered in Epic   Medication Sig Dispense Refill    traZODone (DESYREL) 50 MG Tab Take 1 Tablet by mouth every evening. 30 Tablet 3    DULoxetine (CYMBALTA) 30 MG Cap DR Particles Take 2 Capsules by mouth every day. 60 Capsule 4     No current Epic-ordered facility-administered medications on file.       No past medical history on file.     No past surgical history on file.     No family history on file.       ROS:  Gen: no fevers/chills, no weight loss  Pulm: no sob, no cough  CV: no chest pain, no palpitations  GI: no nausea/vomiting, no diarrhea  : no dysuria  MSk: no myalgias  Skin: no rash  Neuro: no headaches, no weakness      Objective:     Exam:  /74 (BP Location: Left arm, Patient Position: Sitting, BP Cuff Size: Adult)   Pulse 64   Temp 36.9 °C (98.4 °F) (Oral)   Resp 16   Ht 1.778 m (5' 10\")   Wt 65.3 kg (144 lb)   SpO2 98%   BMI 20.66 kg/m²  Body mass index is 20.66 kg/m².    Gen: Alert and oriented, No apparent " distress.  Head:  NCAT, EOMI, sclera clear without discharge  Neck: Neck is supple without lymphadenopathy. Slight fullness in thyroid on right side  Lungs: Normal effort, CTA bilaterally, no wheezes, rhonchi, or rales  CV: Regular rate and rhythm. No murmurs, rubs, or gallops.  Abd:   Non-distended, soft  Ext: No clubbing, cyanosis, edema.  MSK: Unassisted gait  Derm: No lesions on exposed skin  Psych: normal mood and affect        Assessment & Plan:     34 y.o. male with the following -     Problem List Items Addressed This Visit       Anxiety    Patient was started on Cymbalta 30 mg at previous visit. He says he has had positive results. Discussed whether to increase dose or to continue with low dose given that he will be starting Trazadone. Patient would like to go forward with increasing dose to 60mg daily.   Plan:   - Increase Cymbalta to 60mg daily  - Patient has therapy appointment in May, he wanted another referral to be placed to include offices outside of [facility] to see if he can get in sooner  - [facility]   - Follow up in 3 weeks to monitor on increased dose         Relevant Medications    traZODone (DESYREL) 50 MG Tab    DULoxetine (CYMBALTA) 30 MG Cap DR Particles    Other Relevant Orders    Referral to Behavioral Health    Difficulty sleeping    Patient continues to have trouble sleeping. He has tried previously to take Melatonin with little results. He says he would like to start a medication.   Plan:  - Will start Trazadone at low dose at 50mg nightly  - Follow up in 3 weeks to monitor after starting new medication         Subclinical hyperthyroidism    Patient's TSH was low at 0.365, free T4 was high normal at 1.69. He does have insomnia and heart palpitations (though these seem to mostly be associated with stress). Patient had mild fullness of the right side of the thyroid.  Plan:   - Will repeat TSH with reflex to T4, if TSH continues  to be low will order US         Relevant Orders    TSH WITH REFLEX TO FT4         Follow-up: March 26, 2025

## 2025-03-05 NOTE — ASSESSMENT & PLAN NOTE
Patient continues to have trouble sleeping. He has tried previously to take Melatonin with little results. He says he would like to start a medication.   Plan:  - Will start Trazadone at low dose at 50mg nightly  - Follow up in 3 weeks to monitor after starting new medication

## 2025-03-10 NOTE — Clinical Note
REFERRAL APPROVAL NOTICE         Sent on March 10, 2025                   Mario Meade  [address] [address] [place] [address]                   Dear Mr. Meade,    After a careful review of the medical information and benefit coverage, [facility] has processed your referral. See below for additional details.    If applicable, you must be actively enrolled with your insurance for coverage of the authorized service. If you have any questions regarding your coverage, please contact your insurance directly.    REFERRAL INFORMATION   Referral #:  [id number]  Referred-To Provider    Referred-By Provider:  Behavioral Health    Radha Mejia D.O.   [facility]      [address]  [facility] [address]  [phone] [address]  [id number]  [facility] [address]  [phone]    Referral Start Date:  03/05/2025  Referral End Date:   03/05/2026             SCHEDULING  If you do not already have an appointment, please call [phone] to make an appointment.     MORE INFORMATION  If you do not already have a [employer] account, sign up at: [email or web address]  You can access your medical information, make appointments, see lab results, billing information, and more.  If you have questions regarding this referral, please contact  the [facility] Referrals department at:             [phone]. Monday - Friday 8:00AM - 5:00PM.     Sincerely,    [facility]

## 2025-03-12 ENCOUNTER — HOSPITAL ENCOUNTER (OUTPATIENT)
Dept: LAB | Facility: MEDICAL CENTER | Age: 35
End: 2025-03-12
Payer: COMMERCIAL

## 2025-03-12 DIAGNOSIS — E05.90 SUBCLINICAL HYPERTHYROIDISM: ICD-10-CM

## 2025-03-12 LAB — TSH SERPL DL<=0.005 MIU/L-ACNC: 0.38 UIU/ML (ref 0.38–5.33)

## 2025-03-12 PROCEDURE — 84443 ASSAY THYROID STIM HORMONE: CPT

## 2025-03-12 PROCEDURE — 36415 COLL VENOUS BLD VENIPUNCTURE: CPT

## 2025-03-17 ENCOUNTER — RESULTS FOLLOW-UP (OUTPATIENT)
Dept: MEDICAL GROUP | Facility: CLINIC | Age: 35
End: 2025-03-17

## 2025-03-17 DIAGNOSIS — F41.9 ANXIETY: ICD-10-CM

## 2025-03-17 RX ORDER — DULOXETIN HYDROCHLORIDE 30 MG/1
60 CAPSULE, DELAYED RELEASE ORAL DAILY
Qty: 60 CAPSULE | Refills: 10 | Status: SHIPPED | OUTPATIENT
Start: 2025-03-17 | End: 2025-03-26

## 2025-03-26 ENCOUNTER — OFFICE VISIT (OUTPATIENT)
Dept: MEDICAL GROUP | Facility: CLINIC | Age: 35
End: 2025-03-26
Payer: COMMERCIAL

## 2025-03-26 VITALS
RESPIRATION RATE: 16 BRPM | TEMPERATURE: 97.7 F | OXYGEN SATURATION: 99 % | WEIGHT: 142.5 LBS | DIASTOLIC BLOOD PRESSURE: 64 MMHG | BODY MASS INDEX: 20.4 KG/M2 | HEIGHT: 70 IN | HEART RATE: 64 BPM | SYSTOLIC BLOOD PRESSURE: 118 MMHG

## 2025-03-26 DIAGNOSIS — E05.90 SUBCLINICAL HYPERTHYROIDISM: ICD-10-CM

## 2025-03-26 DIAGNOSIS — F41.9 ANXIETY: ICD-10-CM

## 2025-03-26 DIAGNOSIS — G47.9 DIFFICULTY SLEEPING: ICD-10-CM

## 2025-03-26 RX ORDER — DULOXETIN HYDROCHLORIDE 30 MG/1
60 CAPSULE, DELAYED RELEASE ORAL DAILY
Qty: 60 CAPSULE | Refills: 10 | Status: SHIPPED | OUTPATIENT
Start: 2025-03-26 | End: 2025-03-26

## 2025-03-26 RX ORDER — DULOXETIN HYDROCHLORIDE 30 MG/1
60 CAPSULE, DELAYED RELEASE ORAL DAILY
Qty: 120 CAPSULE | Refills: 10 | Status: SHIPPED | OUTPATIENT
Start: 2025-03-26 | End: 2025-03-26

## 2025-03-26 RX ORDER — DULOXETIN HYDROCHLORIDE 30 MG/1
60 CAPSULE, DELAYED RELEASE ORAL DAILY
Qty: 60 CAPSULE | Refills: 10 | Status: SHIPPED | OUTPATIENT
Start: 2025-03-26

## 2025-03-26 ASSESSMENT — FIBROSIS 4 INDEX: FIB4 SCORE: 0.72

## 2025-03-26 NOTE — PROGRESS NOTES
"Subjective:     CC: Follow up anxiety/depression and thyroid labs    HPI:   Mario presents today for follow up. He says he has been doing well on the increased dose of Duloxetine. He says he feels like his anxiety has improved, though he does still have heart palpitations occasionally when he is stressed at work.     He says his sleep has improved some, though he still does sometimes wake up around 2am and has difficulty falling back asleep.     He has a therapy appointment set up in May, he was not able to get in any sooner than this.       Social Hx:    Problem   Subclinical Hyperthyroidism   Anxiety   Difficulty Sleeping       Current Outpatient Medications Ordered in Epic   Medication Sig Dispense Refill    DULoxetine (CYMBALTA) 30 MG Cap DR Particles Take 2 Capsules by mouth every day. 60 Capsule 10     No current [facility]-ordered facility-administered medications on file.       No past medical history on file.     No past surgical history on file.     No family history on file.       ROS:  Gen: no fevers/chills, no weight loss  Pulm: no sob, no cough  CV: no chest pain, no palpitations  GI: no nausea/vomiting, no diarrhea  : no dysuria  MSk: no myalgias  Skin: no rash  Neuro: no headaches, no weakness      Objective:     Exam:  /64 (BP Location: Right arm, Patient Position: Sitting, BP Cuff Size: Adult)   Pulse 64   Temp 36.5 °C (97.7 °F) (Temporal)   Resp 16   Ht 1.778 m (5' 10\")   Wt 64.6 kg (142 lb 8 oz)   SpO2 99%   BMI 20.45 kg/m²  Body mass index is 20.45 kg/m².    Gen: Alert and oriented, No apparent distress.  Head:  NCAT, EOMI, sclera clear without discharge  Neck: Neck is supple without lymphadenopathy.  Lungs: Normal effort, CTA bilaterally, no wheezes, rhonchi, or rales  CV: Regular rate and rhythm. No murmurs, rubs, or gallops.  Abd:   Non-distended, soft  Ext: No clubbing, cyanosis, edema.  MSK: Unassisted gait  Derm: No lesions on exposed skin  Psych: normal mood and " affect        Assessment & Plan:     34 y.o. male with the following -     Problem List Items Addressed This Visit       Anxiety    Patient's Duloxetine was increased to 60mg at last visit. He has been doing well on this dose.   Plan:   - Recommend patient switch to taking the Duloxetine at night as it may help with his sleep  - He still has appointment to start therapy in May, has not been able to get in sooner         Relevant Medications    DULoxetine (CYMBALTA) 30 MG Cap DR Particles    Difficulty sleeping    Started Trazadone at last visit, he says it has helped some but still having some difficulty sleeping. Since Trazadone and Duloxetine have an increased risk of developing serotonin syndrome recommend that patient discontinue this medication.   Plan:  - Discontinue Trazadone  - Start taking Duloxetine at night as well as Benadryl   - Will follow up in one month         Subclinical hyperthyroidism    Repeat TSH level was 0.381 which is just above the normal threshold. His previous Free T4 was 1.69 which is on the high end of normal. He says he still occasionally has heart palpitations but it still seems to be associated with stress and has improved since starting the Duloxetine.   Plan:  - Repeat TSH, T4 and T3 in one month          Relevant Orders    TSH+FREE T4    T3 FREE         Follow-up: 4/23/25

## 2025-03-26 NOTE — ASSESSMENT & PLAN NOTE
Patient's Duloxetine was increased to 60mg at last visit. He has been doing well on this dose.   Plan:   - Recommend patient switch to taking the Duloxetine at night as it may help with his sleep  - He still has appointment to start therapy in May, has not been able to get in sooner  
Repeat TSH level was 0.381 which is just above the normal threshold. His previous Free T4 was 1.69 which is on the high end of normal. He says he still occasionally has heart palpitations but it still seems to be associated with stress and has improved since starting the Duloxetine.   Plan:  - Repeat TSH, T4 and T3 in one month   
Started Trazadone at last visit, he says it has helped some but still having some difficulty sleeping. Since Trazadone and Duloxetine have an increased risk of developing serotonin syndrome recommend that patient discontinue this medication.   Plan:  - Discontinue Trazadone  - Start taking Duloxetine at night as well as Benadryl   - Will follow up in one month  
Opt out

## 2025-04-17 ENCOUNTER — HOSPITAL ENCOUNTER (OUTPATIENT)
Dept: LAB | Facility: MEDICAL CENTER | Age: 35
End: 2025-04-17
Payer: COMMERCIAL

## 2025-04-17 DIAGNOSIS — E05.90 SUBCLINICAL HYPERTHYROIDISM: ICD-10-CM

## 2025-04-17 LAB
T3FREE SERPL-MCNC: 3.11 PG/ML (ref 2–4.4)
T4 FREE SERPL-MCNC: 1.48 NG/DL (ref 0.93–1.7)
TSH SERPL-ACNC: 0.33 UIU/ML (ref 0.38–5.33)

## 2025-04-17 PROCEDURE — 84439 ASSAY OF FREE THYROXINE: CPT

## 2025-04-17 PROCEDURE — 84481 FREE ASSAY (FT-3): CPT

## 2025-04-17 PROCEDURE — 36415 COLL VENOUS BLD VENIPUNCTURE: CPT

## 2025-04-17 PROCEDURE — 84443 ASSAY THYROID STIM HORMONE: CPT

## 2025-04-23 ENCOUNTER — OFFICE VISIT (OUTPATIENT)
Dept: MEDICAL GROUP | Facility: CLINIC | Age: 35
End: 2025-04-23
Payer: COMMERCIAL

## 2025-04-23 VITALS
OXYGEN SATURATION: 97 % | HEART RATE: 60 BPM | TEMPERATURE: 97.2 F | HEIGHT: 70 IN | SYSTOLIC BLOOD PRESSURE: 112 MMHG | DIASTOLIC BLOOD PRESSURE: 72 MMHG | BODY MASS INDEX: 19.88 KG/M2 | WEIGHT: 138.9 LBS

## 2025-04-23 DIAGNOSIS — Z23 NEED FOR VACCINATION: ICD-10-CM

## 2025-04-23 DIAGNOSIS — E05.90 SUBCLINICAL HYPERTHYROIDISM: ICD-10-CM

## 2025-04-23 DIAGNOSIS — G47.9 DIFFICULTY SLEEPING: ICD-10-CM

## 2025-04-23 ASSESSMENT — FIBROSIS 4 INDEX: FIB4 SCORE: 0.74

## 2025-04-23 NOTE — ASSESSMENT & PLAN NOTE
Patient is currently using Benadryl and Melatonin with some improvement in sleep. Still waking up most nights. He has his first therapy appointment in May. We discussed also doing CBT-I with Dr. Lombardero, he is open to the idea.  Plan:   - Referral placed  - Follow up in 3 months after his first therapy appointment to discuss progress

## 2025-04-23 NOTE — PROGRESS NOTES
"Subjective:     CC: Follow up thyroid    HPI:   Mario presents today for follow up on thyroid labs and sleep.     Subclinical Hyperthyroid  Discussed with patient the diagnosis of subclinical hyperthyroidism. He previously was having regular heart palpitations, he says they have decreased with the improvement in his stress on the Duloxetine.     Sleep  Pt is sleeping better but still wakes up most nights. He previously was waking up for hours at a time, now says it is at most one hour. He is using Benadryl and Melatonin. He says it seems to help some, but he wakes up a little groggy in the mornings. He has a therapy appointment scheduled in May.      Social Hx:    Problem   Need for Vaccination   Subclinical Hyperthyroidism    Patient has had multiple low TSH levels, T4 and T3 have been WNL.   4/17/25:  - TSH 0.326  - Free T4 1.48  - Free T3 3.11     Difficulty Sleeping       Current Outpatient Medications Ordered in Epic   Medication Sig Dispense Refill    DULoxetine (CYMBALTA) 30 MG Cap DR Particles Take 2 Capsules by mouth every day. 60 Capsule 10     No current [facility]-ordered facility-administered medications on file.       No past medical history on file.     No past surgical history on file.     No family history on file.       ROS:  Gen: no fevers/chills, no weight loss  Pulm: no sob, no cough  CV: no chest pain, no palpitations  GI: no nausea/vomiting, no diarrhea  : no dysuria  MSk: no myalgias  Skin: no rash  Neuro: no headaches, no weakness      Objective:     Exam:  /72 (BP Location: Left arm, Patient Position: Sitting, BP Cuff Size: Adult)   Pulse 60   Temp 36.2 °C (97.2 °F) (Temporal)   Ht 1.778 m (5' 10\")   Wt 63 kg (138 lb 14.4 oz)   SpO2 97%   BMI 19.93 kg/m²  Body mass index is 19.93 kg/m².    Gen: Alert and oriented, No apparent distress.  Head:  NCAT, EOMI, sclera clear without discharge  Neck: Neck is supple without lymphadenopathy.  Lungs: Normal effort, CTA bilaterally, no " wheezes, rhonchi, or rales  CV: Regular rate and rhythm. No murmurs, rubs, or gallops.  Abd:   Non-distended, soft  Ext: No clubbing, cyanosis, edema.  MSK: Unassisted gait  Derm: No lesions on exposed skin  Psych: normal mood and affect        Assessment & Plan:     35 y.o. male with the following -     Problem List Items Addressed This Visit       Difficulty sleeping    Patient is currently using Benadryl and Melatonin with some improvement in sleep. Still waking up most nights. He has his first therapy appointment in May. We discussed also doing CBT-I with Dr. Lombardero, he is open to the idea.  Plan:   - Referral placed  - Follow up in 3 months after his first therapy appointment to discuss progress         Relevant Orders    Referral to Behavioral Health    Subclinical hyperthyroidism    Patient with consistent low TSH with normal T4 and T3. Is not having overt symptoms other than heart palpitations and difficulty sleeping, both of which seem to be associated more with anxiety/depression.   - Plan to repeat labs in 6 months (Oct. 2025)         Need for vaccination    Relevant Orders    Hepatitis B Vaccine Adult 20+ (Completed)         Follow-up: 3 months

## 2025-04-23 NOTE — ASSESSMENT & PLAN NOTE
Patient with consistent low TSH with normal T4 and T3. Is not having overt symptoms other than heart palpitations and difficulty sleeping, both of which seem to be associated more with anxiety/depression.   - Plan to repeat labs in 6 months (Oct. 2025)

## 2025-04-30 NOTE — Clinical Note
REFERRAL APPROVAL NOTICE         Sent on April 30, 2025                   Mario Meade  [address] [place] [address]                   Dear Mr. Meade,    After a careful review of the medical information and benefit coverage, [facility] has processed your referral. See below for additional details.    If applicable, you must be actively enrolled with your insurance for coverage of the authorized service. If you have any questions regarding your coverage, please contact your insurance directly.    REFERRAL INFORMATION   Referral #:  [id number]  Referred-To Department    Referred-By Provider:  Behavioral Health    Radha Mejia D.O.   [facility]      [address]  [facility] [address]  [phone] [address]  [facility] [id number] [phone]    Referral Start Date:  04/23/2025  Referral End Date:   04/23/2026             SCHEDULING  If you do not already have an appointment, please call [phone] to make an appointment.     MORE INFORMATION  If you do not already have a [employer] account, sign up at: [email or web address]  You can access your medical information, make appointments, see lab results, billing information, and more.  If you have questions regarding this referral, please contact  the [facility] Referrals department at:             [phone]. Monday - Friday 8:00AM - 5:00PM.     Sincerely,    [facility]

## 2025-05-20 ENCOUNTER — OFFICE VISIT (OUTPATIENT)
Dept: BEHAVIORAL HEALTH | Facility: CLINIC | Age: 35
End: 2025-05-20
Payer: COMMERCIAL

## 2025-05-20 DIAGNOSIS — G47.00 INSOMNIA, UNSPECIFIED TYPE: ICD-10-CM

## 2025-05-20 DIAGNOSIS — F41.9 ANXIETY DISORDER, UNSPECIFIED TYPE: Primary | ICD-10-CM

## 2025-05-20 PROCEDURE — 90791 PSYCH DIAGNOSTIC EVALUATION: CPT | Performed by: MARRIAGE & FAMILY THERAPIST

## 2025-05-20 NOTE — PROGRESS NOTES
[facility]   Initial Assessment    Name: 'Rikki' Mario Meade  MRN: [id number]  : 1990  Age: 35 y.o.  Date of assessment: 2025  PCP: Radha Mejia D.O.  Persons in attendance: Patient    CHIEF COMPLAINT AND HISTORY OF PRESENTING PROBLEM:  Mario Meade is a 35 y.o., White male referred for assessment by Radha Mejia D.O. [female]    Pt reports he was referred by PCP because of his expressed concerns around anxiety and poor sleep. Just recently, Pt established with his PCP, he was Rx'd duloxetine which is helping significantly with his sleep, and it was suggested that talk therapy could provide an additive benefit to medication. Pt has been taking the duloxetine x3m, and he reports no problematic SE. Now sleeping on average x7h/n, but he would like to improve his sleep even more.    Pt also taking melatonin and benadryl, both also taken to help with sleep.    Pt thinks his impaired sleep is related to his anxiety, particularly anxious thoughts, which are  often centered around work. 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 He likes his work.    He states that his partner has given him feedback in that he has some 'busy body traits, is always in need of creating checklists, and has difficulty relaxing.'    BEHAVIORAL HEALTH TREATMENT HISTORY  Does patient/parent report a history of prior behavioral health treatment for patient? No, yet Pt engages self care, he's a runner, skis and hikes, often on the go outdoors when not at work    FAMILY/SOCIAL HISTORY  Current living situation/household members: with domestic female partner, recently a purchased home together  Does patient/parent report a family history of behavioral health issues, diagnoses, or treatment?   No family history on file.  none    EMPLOYMENT/RESOURCES  Is the patient currently employed? Yes, works a , in [employer] field  Does the patient/parent report adequate financial resources? Yes    ABUSE/NEGLECT/TRAUMA SCREENING  Does patient report feeling “unsafe” in his/her home, or afraid of anyone? No  Does patient report any history of physical, sexual, or emotional abuse? No  Is there evidence of neglect by self? No                                                                                                        SAFETY ASSESSMENT - SELF  Does patient acknowledge current or past symptoms of dangerousness to self? no  Recent change in frequency/specificity/intensity of suicidal thoughts or self-harm behavior? No  Current access to firearms, medications, or other identified means of suicide/self-harm? No    Current Suicide Risk: Low  Crisis Safety Plan completed and copy given to patient: not applicable    SAFETY ASSESSMENT - OTHERS  Recent change in frequency/specificity/intensity of thoughts or threats to harm others?  No     Current Homicide Risk:  Low  Crisis Safety Plan completed and copy given to patient? not applicable    SUBSTANCE USE/ADDICTION HISTORY  Yes, Pt reports infrequent use of kratom, 'it helps with anxiety,' 'very rarely' uses psilocybin, and will have 1-2 alcoholic drinks most nights, usually a few hours before getting into bed. He does not feel the use of any of these substances is problematic, but recognizes how he uses alcohol could be a factor in his disrupted sleep    MENTAL STATUS/OBSERVATIONS              Participation: Active verbal participation  Grooming: casual  Orientation:Alert and Fully Oriented   Behavior: Calm  Eye contact: Good          Mood:Euthymic  Affect:Flexible and Full range  Thought process: Logical and Goal-directed  Speech: Rate within normal limits and Volume within normal limits  Memory: No gross evidence of memory deficits  Insight: Good  Judgment:  Good    Patient's  motivation/readiness for change: motivated    Care plan completed: Preliminarily: Pt wants to address the 'anxiety and stress' stemming from work, and find ways to better manage it    And     He wants to address his sleep problems from a CBT-I perspective.    I gave Pt two self assessments to complete, and literature on a CBT-I approach to improving his sleep.    Does patient express agreement with the plan? Yes     Diagnosis: F41.9 Anxiety D/O, Unspecified; G47.00 Insomnia    SAM Alfonso

## 2025-06-03 ENCOUNTER — OFFICE VISIT (OUTPATIENT)
Dept: BEHAVIORAL HEALTH | Facility: CLINIC | Age: 35
End: 2025-06-03
Payer: COMMERCIAL

## 2025-06-03 DIAGNOSIS — G47.00 INSOMNIA, UNSPECIFIED TYPE: ICD-10-CM

## 2025-06-03 DIAGNOSIS — F41.9 ANXIETY DISORDER, UNSPECIFIED TYPE: Primary | ICD-10-CM

## 2025-06-03 PROCEDURE — 90834 PSYTX W PT 45 MINUTES: CPT | Performed by: MARRIAGE & FAMILY THERAPIST

## 2025-06-03 NOTE — PROGRESS NOTES
[facility]   Therapy Progress Note    Name: Mario Meade  MRN: [id number]  : 1990  Age: 35 y.o.  Date of assessment: 6/3/2025  PCP: Radha Mejia D.O.  Persons in attendance: Patient  Total session time: 45 m    Pt reports: Since last session, no changes in his sleep. Pt experiencing vivid, 'bad' dreams. Gets 6.5h sleep/night; he'd like to get more. We discussed at length Pt's sleep problems, medications, and our plan to transition into a CBT-I approach, while staying attentive to no wanting to change too many factors at same time. Pt to discuss this likely SE, and possible medication interaction concerns, with his prescriber.    We discussed Pt's anxiety, around work, his thoughts about anxiety, work stressors. Pt often takes work home with him.  Has had difficulty delegating tasks. He's sought mentoring at work, and has received other professional training. Pt explained he is wrapping up a 'high stress' project he's been working on at work for 1-2 years, and it's lessening his anxiety as related to this project.    We discussed an evolving strategy how Pt might further manage anxiety and sleep from a CBT perspective.    Objective Observations:   Participation:Active verbal participation, Attentive, and Engaged   Grooming:Casual   Cognition:Alert and Fully Oriented   Eye Contact:Good   Mood:Euthymic   Affect:Flexible   Thought Process:Goal-directed   Speech:Rate within normal limits and Volume within normal limits    Current Risk:   Suicide: low   Homicide: low   Self-Harm: low    Evaluation: Pt engaged, we're beginning to address Pt's concerns around anxiety and sleep.    Plan: Will further discuss: How does Pt experience anxiety? and how does he manage his work stressors? Other stressors?     Pt also want to discuss traits, where he feels he's 'like his father'  Puts in a lot of time working  A 'busy body,' always have to be doing something  A way of being 'stoic'    Care Plan Updated:  Yes    Does patient express agreement with the treatment plan? Yes     Diagnosis: F41.9 Anxiety D/O, Unspecified; G47.00 Insomnia     SAM Alfonso

## 2025-06-17 ENCOUNTER — OFFICE VISIT (OUTPATIENT)
Dept: BEHAVIORAL HEALTH | Facility: CLINIC | Age: 35
End: 2025-06-17
Payer: COMMERCIAL

## 2025-06-17 DIAGNOSIS — F41.9 ANXIETY DISORDER, UNSPECIFIED TYPE: Primary | ICD-10-CM

## 2025-06-17 DIAGNOSIS — G47.00 INSOMNIA, UNSPECIFIED TYPE: ICD-10-CM

## 2025-06-17 NOTE — PROGRESS NOTES
[facility]   Therapy Progress Note    Name: Mario Meade  MRN: [id number]  : 1990  Age: 35 y.o.  Date of assessment: 2025  PCP: Radha Mejia D.O.  Persons in attendance: Patient  Total session time: 55 m    Pt reports: ***    Objective Observations:   Participation:{[facility] PARTICIPATION MEASURES:71930504}   Grooming:{[facility] GROOM[facility]}   Cognition:{[facility] ORIENTATION:70950414}   Eye Contact:{[facility] EYE CONTACT:66038913}   Mood:{[facility] MOOD:16528178}   Affect:{[facility] AFFECT:78116199}   Thought Process:{[facility] THOUGHT PROCESS:42334900}   Speech:{[facility] SPEECH:07177209}    Current Risk:   Suicide: {Desc; low/moderate/high:382963}   Homicide: {Desc; low/moderate/high:540268}   Self-Harm: {Desc; low/moderate/high:579680}   Safety Plan Reviewed: {Response; yes/no/na:33635}    Evaluation: ***    Plan: ***    Care Plan Updated: {Yes/No/WC:496621}    Does patient express agreement with the treatment plan? {Yes/No/WC:407638}     Diagnosis:     SAM Alfonso

## 2025-06-17 NOTE — PROGRESS NOTES
"[facility]   Therapy Progress Note    Name: Mario Meade  MRN: [id number]  : 1990  Age: 35 y.o.  Date of assessment: 2025  PCP: Radha Mejia D.O.  Persons in attendance: Patient  Total session time: 50 min    Pt reports: Pt reports he is \"overall pretty good\". A work issue came up yesterday for a \"stop work order\" and pt states he doesn't know why he got it. It may be related to [employer] funding cuts, but it's not federally funded, it's funded but a private not for profit. The project is a drinking water study in [place] for treatment systems to remove contaminants. Phase one is completed for water quality testing. The next phase would have been to set up treatment systems in homes in [place]. It's not the only project he is working on but it does fund him for the rest of the calendar year. He doesn't think it's performance related.     Has been thinking \"a lot\" about potential reasons  - Part of the bigger picture of water treatment, but the contaminants in [place] were low (so maybe not a good test site)    Is actively working on not looking at work email outside of work hours and talking through work issues out loud with his significant other.     Overall sleep quality was \"bad last week\" he had a big deadline and was up working on projects during the night at home. Is working on the sense of gratification of completing tasks so he can get to sleep in the hours he does have in bed.Overall feels as though his anxiety has decreased. Continues Cymbalta, melatonin, and benadryl.    Manifestations of anxiety:  Physical feelings of releasing stress, trying to physically relax. His usual state is \"tense\".   On edge - manifests itself as being too sensitive to issues that arise, takes things personally. Can get defensive.  Up tight - had an interaction with close colleagues where one characterized him as up tight and one characterized him as easy going. He would characterize himself as up " "tight.     Traits where he feels like he is like his father, particularly communication style.  Currently, the extent of their relationship and communication is through quick texts and through his mother. Would characterize the relationship as strained. It is the result of a specific event when his dad came to [place] from [place] to help with fixing up the new house, about 3 years ago. Stayed with them for 1 month.Fathers expectations were not met regarding gratitude from Pt and his significant other. Ended in a \"yelling\" argument.     Objective Observations:   Participation:Active verbal participation   Grooming:Good, Casual, and Neat   Cognition:Alert and Fully Oriented   Eye Contact:Good   Mood:Euthymic   Affect:Flexible, Full range, and Congruent with content   Thought Process:Logical and Goal-directed   Speech:Rate within normal limits and Volume within normal limits    Current Risk:   Suicide: low   Homicide: low   Self-Harm: low   Safety Plan Reviewed: not applicable    Evaluation: Patient presents with stable mood but increased cognitive preoccupation related to a sudden stop work order on a key project. Denies performance concerns; attributes stress to funding issues. Anxiety symptoms include physical tension, hypersensitivity to feedback, and defensiveness. Sleep disrupted by work-related stress; patient is addressing this with behavioral strategies. Relationship with father remains strained, contributing to underlying emotional tension. Insight is good; patient continues Cymbalta with partial symptom relief. Recommend continued support for anxiety management and relational processing.    Plan:     Goal #1: Improve anxiety management and emotional regulation  Objective 1.1: Patient will identify and use at least one coping strategy (e.g., boundaries with work, somatic relaxation) during work-related stressors.  Interventions:  Support use of behavioral boundaries (e.g., limiting email outside work " hours)  Reinforce somatic strategies for tension (e.g., guided relaxation, stretching)  Explore cognitive reframing of perceived criticism or conflict    Goal #2: Improve sleep hygiene and quality  Objective 2.1: Patient will report improved sleep quality with fewer nighttime disruptions within 4 weeks.  Interventions:  Review and reinforce pre-sleep routines  Encourage reduction of work activity before bedtime  Monitor impact of anxiety on sleep and adjust plan as needed    Goal #3: Support processing of interpersonal conflict and strained paternal relationship  Objective 3.1: Patient will identify and verbalize emotional impact of paternal conflict during session.  Interventions:  Facilitate reflective dialogue around unresolved family dynamics  Normalize ambivalence and emotional responses  Assess for readiness for deeper relational work if indicated  Research Big 5 Personality Traits, compare and contrast to father's personality.    Care Plan Updated: Yes    Does patient express agreement with the treatment plan? Yes     Diagnosis: F41.9 Anxiety D/O, Unspecified; G47.00 Insomnia     JOYCE OchoaP - Student

## 2025-07-01 ENCOUNTER — OFFICE VISIT (OUTPATIENT)
Dept: BEHAVIORAL HEALTH | Facility: CLINIC | Age: 35
End: 2025-07-01
Payer: COMMERCIAL

## 2025-07-01 DIAGNOSIS — F41.9 ANXIETY DISORDER, UNSPECIFIED TYPE: Primary | ICD-10-CM

## 2025-07-01 DIAGNOSIS — G47.00 INSOMNIA, UNSPECIFIED TYPE: ICD-10-CM

## 2025-07-01 PROCEDURE — 90834 PSYTX W PT 45 MINUTES: CPT | Performed by: MARRIAGE & FAMILY THERAPIST

## 2025-07-01 NOTE — PROGRESS NOTES
"[facility]   Therapy Progress Note    Name: Mario Meade  MRN: [id number]  : 1990  Age: 35 y.o.  Date of assessment: 2025  PCP: Radha Mejia D.O.  Persons in attendance: Patient  Total session time: 50 min    Pt reports:     Pt states he is \"good\" but has \"zero update\" on his \"stop work\" order. However, he states he did meet with his boss about it, doesn't indicate concern at this time. Has a meeting scheduled with the client at the end of July to discuss \"stage 2\" of the project. Pt states he is able to \"move past it mostly\" but states it did take him a few days. Pt states that the previously set goal of otherwise placing boundaries between work and personal life are \"not good\". He believes that the issue may revolve around being able to work from home and not actually having defined what the boundaries are. States that last night he worked at home until 10 pm but felt good about it because it was productive work and he was in a \"good groove\" but states that if he had to stop for some reason it would \"probably create a stressor\" for him.    Pt states he will take vacation days but not apply vacation hours from his leave bank to help \"balance out\" the amount of time he works even thought he typically works over 40 hours a week. Pt states he took a time management and leadership management workshop in the recent past. One of the goals he made during that training was to work 45 hours a week.The nature of his work tends to make this goal hard to achieve. He states he is uncertain if he just wants to accept this fact about his work environment/industry, but largely thinks that he is willing to accept it. Wants to define the boundaries, subtract hours from subsequent days if he hs worked long hours on previous days, better utilize his PTO leave.    Pt states that his partner, Gaviota, \"is not a fan\" of his long working hours. She is \"ok\" with long work hours when necessary but not as a " "routine. It has \"negative impacts on the relationship\" due to her concern over work strain on him. He states that Gaviota is in a \"transitional stage\" of her own career, although they originally met at work. She is currently getting a masters in social work. She is working as an intern in [place] with troubled youth.    Pt states that his sleeping has \"noticeably improved\" since completion of his last big project. States he is uncertain is the sleep problems would return if work stressors returned. States that he is still taking his sleep medications. States he is considering weaning off melatonin because he still gets strange dreams. Has not yet done the bedside notes/journaling but would like to in addition to working more on sleep hygiene. States he will continue to work on the CBT-1 for insomnia materials provided to him.     States he has a trip back to [place], [place] where is he from, where he will be meeting his parents, but seeing family is not the purpose of the trip. In August his brother is getting  where he will be in close proximity to his father for a number of days. States his main stressor is the conflict between Gaviota and his father.      Objective Observations:   Participation:Active verbal participation, Attentive, and Engaged   Grooming:Good, Casual, and Neat   Cognition:Alert and Fully Oriented   Eye Contact:Good   Mood:Euthymic   Affect:Full range and Congruent with content   Thought Process:Logical and Goal-directed   Speech:Rate within normal limits and Volume within normal limits    Current Risk:   Suicide: low   Homicide: low   Self-Harm: low   Safety Plan Reviewed: not applicable    Evaluation:   Pt presents with insight into work-related stress and recognizes the need for clearer boundaries between professional and personal life. He reports partial resolution from a recent workplace issue and improvement in sleep. Although his strategies for managing workload are still " developing and somewhat inconsistent, he remains motivated and engaged in making sustainable changes. He is receptive to refining his time management and sleep hygiene practices.     Plan:   Goal: Support patient's ongoing efforts to manage work-related stresses and maintain well-being.  Intervention:    Continue with the previously agreed upon care plan, including support for sleep hygiene (CBT-1 for insomnia), stress management, and family dynamics with his father.  Add focused work on defining and implementing healthy boundaries between work and personal life, including identifying specific limits and time-use strategies.    Care Plan Updated: Yes    Does patient express agreement with the treatment plan? Yes     Diagnosis: F41.9 Anxiety D/O, Unspecified; G47.00 Insomnia    Bonnie Beatty, JOYCEP - Student

## 2025-07-01 NOTE — PROGRESS NOTES
[facility]   Therapy Progress Note    Name: Mario Meade  MRN: [id number]  : 1990  Age: 35 y.o.  Date of assessment: 2025  PCP: Radha Mejia D.O.  Persons in attendance: Patient  Total session time: {***}    Pt reports: ***    Objective Observations:   Participation:{[facility] PARTICIPATION MEASURES:86133674}   Grooming:{[facility]}   Cognition:{[facility] ORIENTATION:12178837}   Eye Contact:{[facility] EYE CONTACT:83324314}   Mood:{[facility] MOOD:96000033}   Affect:{[facility] AFFECT:77173782}   Thought Process:{[facility] THOUGHT PROCESS:86981831}   Speech:{[facility] SPEECH:44199475}    Current Risk:   Suicide: {Desc; low/moderate/high:487925}   Homicide: {Desc; low/moderate/high:598292}   Self-Harm: {Desc; low/moderate/high:098867}   Safety Plan Reviewed: {Response; yes/no/na:47064}    Evaluation: ***    Plan: ***    Care Plan Updated: {Yes/No/WC:026131}    Does patient express agreement with the treatment plan? {Yes/No/WC:114419}     Diagnosis:     SAM Alfonso

## 2025-07-29 ENCOUNTER — APPOINTMENT (OUTPATIENT)
Dept: BEHAVIORAL HEALTH | Facility: CLINIC | Age: 35
End: 2025-07-29
Payer: COMMERCIAL

## 2025-08-15 ENCOUNTER — OFFICE VISIT (OUTPATIENT)
Dept: MEDICAL GROUP | Facility: CLINIC | Age: 35
End: 2025-08-15
Payer: COMMERCIAL

## 2025-08-15 VITALS
WEIGHT: 146 LBS | DIASTOLIC BLOOD PRESSURE: 86 MMHG | TEMPERATURE: 98.2 F | SYSTOLIC BLOOD PRESSURE: 127 MMHG | BODY MASS INDEX: 20.9 KG/M2 | HEART RATE: 81 BPM | HEIGHT: 70 IN | OXYGEN SATURATION: 95 %

## 2025-08-15 DIAGNOSIS — S22.000A COMPRESSION FRACTURE OF BODY OF THORACIC VERTEBRA (HCC): ICD-10-CM

## 2025-08-15 DIAGNOSIS — S32.000A CLOSED COMPRESSION FRACTURE OF LUMBOSACRAL SPINE, INITIAL ENCOUNTER (HCC): ICD-10-CM

## 2025-08-15 DIAGNOSIS — R56.9 SEIZURE (HCC): Primary | ICD-10-CM

## 2025-08-15 PROCEDURE — 99214 OFFICE O/P EST MOD 30 MIN: CPT | Mod: GC

## 2025-08-15 PROCEDURE — 3079F DIAST BP 80-89 MM HG: CPT | Mod: GC

## 2025-08-15 PROCEDURE — 3074F SYST BP LT 130 MM HG: CPT | Mod: GC

## 2025-08-15 RX ORDER — DOCUSATE CALCIUM 240 MG
240 CAPSULE ORAL 2 TIMES DAILY
COMMUNITY

## 2025-08-15 RX ORDER — OXYCODONE HYDROCHLORIDE 5 MG/1
5 TABLET ORAL EVERY 4 HOURS PRN
COMMUNITY

## 2025-08-15 RX ORDER — CALCITONIN SALMON 200 [IU]/.09ML
1 SPRAY, METERED NASAL DAILY
Qty: 3 ML | Refills: 0 | Status: SHIPPED | OUTPATIENT
Start: 2025-08-15 | End: 2025-09-14

## 2025-08-15 RX ORDER — IBUPROFEN 200 MG
600 TABLET ORAL EVERY 6 HOURS PRN
COMMUNITY

## 2025-08-15 RX ORDER — MELOXICAM 7.5 MG/1
15 TABLET ORAL DAILY
Qty: 60 TABLET | Refills: 0 | Status: SHIPPED | OUTPATIENT
Start: 2025-08-15

## 2025-08-15 ASSESSMENT — FIBROSIS 4 INDEX: FIB4 SCORE: 0.74

## 2025-08-19 ENCOUNTER — OFFICE VISIT (OUTPATIENT)
Dept: BEHAVIORAL HEALTH | Facility: CLINIC | Age: 35
End: 2025-08-19
Payer: COMMERCIAL

## 2025-08-19 DIAGNOSIS — G47.00 INSOMNIA, UNSPECIFIED TYPE: ICD-10-CM

## 2025-08-19 DIAGNOSIS — F41.9 ANXIETY DISORDER, UNSPECIFIED TYPE: Primary | ICD-10-CM

## 2025-08-19 PROCEDURE — 90837 PSYTX W PT 60 MINUTES: CPT | Performed by: MARRIAGE & FAMILY THERAPIST

## 2025-08-22 ENCOUNTER — OFFICE VISIT (OUTPATIENT)
Dept: NEUROLOGY | Facility: MEDICAL CENTER | Age: 35
End: 2025-08-22
Attending: PSYCHIATRY & NEUROLOGY
Payer: COMMERCIAL

## 2025-08-22 VITALS
HEIGHT: 70 IN | OXYGEN SATURATION: 96 % | HEART RATE: 70 BPM | BODY MASS INDEX: 20.58 KG/M2 | WEIGHT: 143.74 LBS | TEMPERATURE: 98.6 F | DIASTOLIC BLOOD PRESSURE: 62 MMHG | RESPIRATION RATE: 16 BRPM | SYSTOLIC BLOOD PRESSURE: 110 MMHG

## 2025-08-22 DIAGNOSIS — R56.9 SEIZURE (HCC): Primary | ICD-10-CM

## 2025-08-22 PROCEDURE — 3078F DIAST BP <80 MM HG: CPT | Performed by: PSYCHIATRY & NEUROLOGY

## 2025-08-22 PROCEDURE — 3074F SYST BP LT 130 MM HG: CPT | Performed by: PSYCHIATRY & NEUROLOGY

## 2025-08-22 PROCEDURE — 99204 OFFICE O/P NEW MOD 45 MIN: CPT | Performed by: PSYCHIATRY & NEUROLOGY

## 2025-08-22 RX ORDER — LACOSAMIDE 50 MG/1
50 TABLET ORAL 2 TIMES DAILY
Qty: 60 TABLET | Refills: 5 | Status: SHIPPED | OUTPATIENT
Start: 2025-08-22 | End: 2026-02-18

## 2025-08-22 ASSESSMENT — PATIENT HEALTH QUESTIONNAIRE - PHQ9: CLINICAL INTERPRETATION OF PHQ2 SCORE: 0

## 2025-08-22 ASSESSMENT — FIBROSIS 4 INDEX: FIB4 SCORE: 0.74

## 2025-08-22 ASSESSMENT — LIFESTYLE VARIABLES
AUDIT-C TOTAL SCORE: 5
HOW MANY STANDARD DRINKS CONTAINING ALCOHOL DO YOU HAVE ON A TYPICAL DAY: 1 OR 2
HOW OFTEN DO YOU HAVE A DRINK CONTAINING ALCOHOL: 2-3 TIMES A WEEK
HOW OFTEN DO YOU HAVE SIX OR MORE DRINKS ON ONE OCCASION: MONTHLY
SKIP TO QUESTIONS 9-10: 0

## 2025-08-24 RX ORDER — MELOXICAM 7.5 MG/1
15 TABLET ORAL DAILY
Qty: 60 TABLET | Refills: 0 | OUTPATIENT
Start: 2025-08-24

## 2025-08-25 ENCOUNTER — APPOINTMENT (OUTPATIENT)
Dept: MEDICAL GROUP | Facility: CLINIC | Age: 35
End: 2025-08-25
Payer: COMMERCIAL

## 2025-08-26 ENCOUNTER — HOSPITAL ENCOUNTER (OUTPATIENT)
Dept: RADIOLOGY | Facility: MEDICAL CENTER | Age: 35
End: 2025-08-26

## 2025-08-27 ENCOUNTER — HOSPITAL ENCOUNTER (OUTPATIENT)
Dept: CARDIOLOGY | Facility: MEDICAL CENTER | Age: 35
End: 2025-08-27
Attending: PSYCHIATRY & NEUROLOGY
Payer: COMMERCIAL

## 2025-08-27 LAB — EKG IMPRESSION: NORMAL

## 2025-08-27 PROCEDURE — 93005 ELECTROCARDIOGRAM TRACING: CPT | Mod: TC | Performed by: PSYCHIATRY & NEUROLOGY
